# Patient Record
Sex: FEMALE | Race: WHITE
[De-identification: names, ages, dates, MRNs, and addresses within clinical notes are randomized per-mention and may not be internally consistent; named-entity substitution may affect disease eponyms.]

---

## 2019-08-22 ENCOUNTER — HOSPITAL ENCOUNTER (INPATIENT)
Dept: HOSPITAL 84 - D.ER | Age: 84
LOS: 16 days | Discharge: HOME | DRG: 881 | End: 2019-09-07
Attending: PSYCHIATRY & NEUROLOGY | Admitting: PSYCHIATRY & NEUROLOGY
Payer: MEDICARE

## 2019-08-22 VITALS
WEIGHT: 152.51 LBS | HEIGHT: 63 IN | BODY MASS INDEX: 27.02 KG/M2 | HEIGHT: 63 IN | BODY MASS INDEX: 27.02 KG/M2 | BODY MASS INDEX: 27.02 KG/M2 | WEIGHT: 152.51 LBS

## 2019-08-22 VITALS — SYSTOLIC BLOOD PRESSURE: 123 MMHG | DIASTOLIC BLOOD PRESSURE: 54 MMHG

## 2019-08-22 DIAGNOSIS — E78.5: ICD-10-CM

## 2019-08-22 DIAGNOSIS — G25.81: ICD-10-CM

## 2019-08-22 DIAGNOSIS — F32.9: Primary | ICD-10-CM

## 2019-08-22 DIAGNOSIS — G47.00: ICD-10-CM

## 2019-08-22 DIAGNOSIS — E53.8: ICD-10-CM

## 2019-08-22 DIAGNOSIS — K21.9: ICD-10-CM

## 2019-08-22 DIAGNOSIS — I10: ICD-10-CM

## 2019-08-22 DIAGNOSIS — M19.91: ICD-10-CM

## 2019-08-22 DIAGNOSIS — R45.851: ICD-10-CM

## 2019-08-22 LAB
ALBUMIN SERPL-MCNC: 3.7 G/DL (ref 3.4–5)
ALP SERPL-CCNC: 74 U/L (ref 46–116)
ALT SERPL-CCNC: 19 U/L (ref 10–68)
AMPHETAMINES UR QL SCN: NEGATIVE QUAL
ANION GAP SERPL CALC-SCNC: 17 MMOL/L (ref 8–16)
APAP SERPL-MCNC: 0 UG/ML (ref 10–30)
APPEARANCE UR: CLEAR
BACTERIA #/AREA URNS HPF: (no result) /HPF
BARBITURATES UR QL SCN: NEGATIVE QUAL
BASOPHILS NFR BLD AUTO: 0.2 % (ref 0–2)
BENZODIAZ UR QL SCN: POSITIVE QUAL
BILIRUB SERPL-MCNC: 0.36 MG/DL (ref 0.2–1.3)
BILIRUB SERPL-MCNC: NEGATIVE MG/DL
BUN SERPL-MCNC: 19 MG/DL (ref 7–18)
BZE UR QL SCN: NEGATIVE QUAL
CALCIUM SERPL-MCNC: 9.6 MG/DL (ref 8.5–10.1)
CANNABINOIDS UR QL SCN: NEGATIVE QUAL
CHLORIDE SERPL-SCNC: 105 MMOL/L (ref 98–107)
CO2 SERPL-SCNC: 22.8 MMOL/L (ref 21–32)
COLOR UR: YELLOW
CREAT SERPL-MCNC: 1.1 MG/DL (ref 0.6–1.3)
CRYSTALS #/AREA URNS HPF: (no result) /HPF
EOSINOPHIL NFR BLD: 1 % (ref 0–7)
ERYTHROCYTE [DISTWIDTH] IN BLOOD BY AUTOMATED COUNT: 13.4 % (ref 11.5–14.5)
ETHANOL SERPL-MCNC: 0 MG/DL (ref 0–10)
GLOBULIN SER-MCNC: 3.4 G/L
GLUCOSE SERPL-MCNC: 119 MG/DL (ref 74–106)
GLUCOSE SERPL-MCNC: NEGATIVE MG/DL
HCT VFR BLD CALC: 39.5 % (ref 36–48)
HGB BLD-MCNC: 13.9 G/DL (ref 12–16)
IMM GRANULOCYTES NFR BLD: 0.2 % (ref 0–5)
KETONES UR STRIP-MCNC: (no result) MG/DL
LYMPHOCYTES NFR BLD AUTO: 37.9 % (ref 15–50)
MAGNESIUM SERPL-MCNC: 2 MG/DL (ref 1.8–2.4)
MCH RBC QN AUTO: 30.7 PG (ref 26–34)
MCHC RBC AUTO-ENTMCNC: 35.2 G/DL (ref 31–37)
MCV RBC: 87.2 FL (ref 80–100)
MONOCYTES NFR BLD: 9.2 % (ref 2–11)
NEUTROPHILS NFR BLD AUTO: 51.5 % (ref 40–80)
NITRITE UR-MCNC: NEGATIVE MG/ML
OPIATES UR QL SCN: NEGATIVE QUAL
OSMOLALITY SERPL CALC.SUM OF ELEC: 283 MOSM/KG (ref 275–300)
PCP UR QL SCN: NEGATIVE QUAL
PH UR STRIP: 5 [PH] (ref 5–6)
PLATELET # BLD: 195 10X3/UL (ref 130–400)
PMV BLD AUTO: 10.4 FL (ref 7.4–10.4)
POTASSIUM SERPL-SCNC: 3.8 MMOL/L (ref 3.5–5.1)
PROT SERPL-MCNC: 7.1 G/DL (ref 6.4–8.2)
PROT UR-MCNC: (no result) MG/DL
RBC # BLD AUTO: 4.53 10X6/UL (ref 4–5.4)
RBC #/AREA URNS HPF: (no result) /HPF (ref 0–5)
SODIUM SERPL-SCNC: 141 MMOL/L (ref 136–145)
SP GR UR STRIP: 1.02 (ref 1–1.02)
SQUAMOUS #/AREA URNS HPF: (no result) /HPF (ref 0–5)
UROBILINOGEN UR-MCNC: NORMAL MG/DL
WBC # BLD AUTO: 10.1 10X3/UL (ref 4.8–10.8)
WBC #/AREA URNS HPF: (no result) /HPF (ref 0–5)

## 2019-08-22 NOTE — NUR
DR KNIGHT NOTIFIED AND REVIEWED PT'S BEHAVIOR AND ASSESSMENT RESULTS.  PT IS
A LOW RISK PER DR KNIGHT.  DR KNIGHT STATED TO GIVE RESOURCES TO PT AT TIME
OF DISCHARGE.  NO FURTHER ORDERS AT THIS TIME.  RESOURCES REVIEWED WITH PT AND
SHE VERBALIZED UNDERSTANDING.  PT STATES THAT SHE HAS HAD
FLEETING THOUGHTS OF
ENDING HER LIFE.  HOWEVER, PT STATES THAT SHE CAN EASILY PUT SUCH THOUGHTS OUT
OF HER MIND.  STATES, " I AM A Latter-day AND I WOULD NEVER FOLLOW THROUGH WITH
HARMING MYSELF.  I AM JUST UNDER A TREMENDOUS AMOUNT OF STRESS AND I NEED
HELP."  PT EXPRESSES A DESIRE TO ADMIT INTO SENIOR CARE TO MANAGE MEDICATIONS
IN HOPES HER IMPROVING HER DEPRESSIVE STATE.  PT STATES THAT SHE IS DEPRESSED
AND FEELS THAT HER CURRENT MEDICATION IS NOT EFFECTIVE.

## 2019-08-22 NOTE — NUR
NEW ADMIT TO DR KNIGHT ON SENIOR CARE FROM [facility] ED FOR DEPRESSION. PATIENT
TRANSPORTED FROM ED TO [facility] VIA WHEELCHAIR. ACCOMPANIED BY STAFF AND
DAUGHTER, JAJA. UPON ARRIVAL TO [facility], PATIENT WAS CALM AND COOPERATIVE
WITH ADMISSION ASSESSMENTS, EKG, AND VITALS. CONSENTS TO TREAT SIGNED BY
PATIENT. CODE WORD IS JAJA. CODE STATUS DISCUSSED WITH PATIENT AND FAMILY.
PATIENT IS TO BE A FULL CODE. PATIENT ORIENTED TO ROOM, UNIT, AND CALL BELL
SYSTEM. PATIENT RESTING IN BED QUIETLY WITH EYES OPEN. BED IN LOWEST POSITION.
2 SIDE RAILS UP. CALL BELL IN REACH. NO FURTHER NEEDS AT THIS TIME.

## 2019-08-23 VITALS — DIASTOLIC BLOOD PRESSURE: 63 MMHG | SYSTOLIC BLOOD PRESSURE: 126 MMHG

## 2019-08-23 LAB
ALBUMIN SERPL-MCNC: 3.7 G/DL (ref 3.4–5)
ALP SERPL-CCNC: 74 U/L (ref 46–116)
ALT SERPL-CCNC: 15 U/L (ref 10–68)
ANION GAP SERPL CALC-SCNC: 15.1 MMOL/L (ref 8–16)
BASOPHILS NFR BLD AUTO: 0.1 % (ref 0–2)
BILIRUB SERPL-MCNC: 0.52 MG/DL (ref 0.2–1.3)
BUN SERPL-MCNC: 18 MG/DL (ref 7–18)
CALCIUM SERPL-MCNC: 10 MG/DL (ref 8.5–10.1)
CHLORIDE SERPL-SCNC: 105 MMOL/L (ref 98–107)
CHOLEST/HDLC SERPL: 3.5 RATIO (ref 2.3–4.1)
CO2 SERPL-SCNC: 26.2 MMOL/L (ref 21–32)
CREAT SERPL-MCNC: 0.9 MG/DL (ref 0.6–1.3)
EOSINOPHIL NFR BLD: 1 % (ref 0–7)
ERYTHROCYTE [DISTWIDTH] IN BLOOD BY AUTOMATED COUNT: 13.5 % (ref 11.5–14.5)
EST. AVERAGE GLUCOSE BLD GHB EST-MCNC: 126 MG/DL (ref 74–154)
GLOBULIN SER-MCNC: 2.8 G/L
GLUCOSE SERPL-MCNC: 133 MG/DL (ref 74–106)
HCT VFR BLD CALC: 39 % (ref 36–48)
HDLC SERPL-MCNC: 54 MG/DL (ref 32–96)
HGB BLD-MCNC: 13.4 G/DL (ref 12–16)
IMM GRANULOCYTES NFR BLD: 0.1 % (ref 0–5)
LDL-HDL RATIO: 2.2 RATIO (ref 1.5–3.5)
LDLC SERPL-MCNC: 120 MG/DL (ref 0–100)
LYMPHOCYTES NFR BLD AUTO: 26.8 % (ref 15–50)
MCH RBC QN AUTO: 30.1 PG (ref 26–34)
MCHC RBC AUTO-ENTMCNC: 34.4 G/DL (ref 31–37)
MCV RBC: 87.6 FL (ref 80–100)
MONOCYTES NFR BLD: 6.6 % (ref 2–11)
NEUTROPHILS NFR BLD AUTO: 65.4 % (ref 40–80)
OSMOLALITY SERPL CALC.SUM OF ELEC: 286 MOSM/KG (ref 275–300)
PLATELET # BLD: 190 10X3/UL (ref 130–400)
PMV BLD AUTO: 10.2 FL (ref 7.4–10.4)
POTASSIUM SERPL-SCNC: 4.3 MMOL/L (ref 3.5–5.1)
PROT SERPL-MCNC: 6.5 G/DL (ref 6.4–8.2)
RBC # BLD AUTO: 4.45 10X6/UL (ref 4–5.4)
SODIUM SERPL-SCNC: 142 MMOL/L (ref 136–145)
TRIGL SERPL-MCNC: 83 MG/DL (ref 30–200)
TSH SERPL-ACNC: 1.24 UIU/ML (ref 0.36–3.74)
WBC # BLD AUTO: 6.7 10X3/UL (ref 4.8–10.8)

## 2019-08-23 NOTE — NUR
PATIENT DAUGHTER CALLED THIS AM AND INQURIED ABOUT HOW HER MOTHER NIGHT WAS
AND HOW HER MORNING IS GOING. PASSCODE GIVEN AND CORRECT. NURSE GAVE
INFORMATION ABOUT SLEEP TIMES AND MEDICATION REQUEST. DAUGHTER ASKED WHAT THE
MEDICATION WAS AND NURSE EXPLAINED.

## 2019-08-23 NOTE — NUR
B) The patient is awake and alert, she admits she is depressed and she says
she did not sleep well last night. She also admits that she fell at home
before she got here on her right buttocks, did assess the area and there is no
bruising noted. I) Provide prescribed meds. R) The patient says she has been
on prozac for close to six weeks she says she feels like it has helped a
little bit but then she says it does not feel better other times. P) Continue
POC.

## 2019-08-23 NOTE — NUR
REC'D PT SITTING IN CHAIR. ORIENTED X4. RELATES REASON FOR HOSPITALIZATION IS
"TO GET SOME HELP TO MAKE ME FEEL BETTER. HELP WITH MY DEPRESSION. TRY TO GET
MY MEDICINE RIGHT." CALM AND COOPERATIVE. ADMINISTER MEDS PER ORDERS Q SHIFT
AND MONITOR COMPLIANCE. ENCOURAGE PATIENT TO VOICE FEELINGS AND CONCERNS TO
STAFF Q SHIFT AND AS NEEDED. MED COMPLIANT. GOAL ORIENTED AS IS AWARE OF THE
DEPRESSION AND WANTING TO GET MEDICATION RIGHT TO MAKE HER FEEL BEETER.
CONTINUE POC AND PROVIDE SAFE ENVIRONMENT.

## 2019-08-23 NOTE — NUR
The patient states "The medicine is not working yet." Explained to her that
sometimes it takes awhile.

## 2019-08-23 NOTE — NUR
The patient is awake and alert, she says she has not slept and she feels
anxious. Did offer her ativan 0.5 mg po now.

## 2019-08-23 NOTE — NUR
LAYING IN BED. AWAKE. RELATED "I'M NOT FEELING SLEEPY YET. I DIDN'T DRINK MUCH
WATER SO I WOULDN'T HAVE TO GET UP AND GO TO THE BATHROOM."

## 2019-08-24 VITALS — DIASTOLIC BLOOD PRESSURE: 52 MMHG | SYSTOLIC BLOOD PRESSURE: 102 MMHG

## 2019-08-24 VITALS — DIASTOLIC BLOOD PRESSURE: 43 MMHG | SYSTOLIC BLOOD PRESSURE: 92 MMHG

## 2019-08-24 VITALS — SYSTOLIC BLOOD PRESSURE: 102 MMHG | DIASTOLIC BLOOD PRESSURE: 52 MMHG

## 2019-08-24 NOTE — PSY
PATIENT NAME:KELLY BAEZA                                    MEDICAL RECORD: [id number]
: 37                                              LOCATION:[facility]
ADMISSION DATE: 19    ACCOUNT: [id number]
                                                           
PSYCHIATRIC EVALUATION
 
 
DATE OF EVALUATION: 19
 
 
IDENTIFYING DATA:  The patient is 84 years old and she is admitted to the
hospital on a voluntary basis.
 
CHIEF COMPLAINT:  Depression.
 
HISTORY OF PRESENT ILLNESS:  The patient presented to the Emergency Room
reporting depression, anxiety, and ongoing thoughts of self-harm, which she is
reluctant to act on because of her Latter day mychal.  She tells me that if it
were not for her Latter day mychal that she would kill herself that she feels
helpless, hopeless, isolated and withdrawn.  She relates this to having to care
for her ill  and apparently he has had medical problems since an accident
30 years ago.  The problems have changed and gotten worse, but she has cared for
him all of this time.  She has been tried on various antidepressants with little
or no effect.  She denies psychotic symptoms.  She is also most concerned about
being unable to sleep adequately.
 
PAST MEDICAL HISTORY:  Significant for hypertension, osteoarthritis, and a left
mastectomy.
 
PAST PSYCHIATRIC HISTORY:  None by her account, although over the past 3 months,
she has had serious depressive issues.
 
FAMILY HISTORY:  Noncontributory.
 
ALLERGIES:  SULFA.
 
CURRENT MEDICATIONS:  Includes Xanax, Prozac, Reglan, Pravachol, Cozaar, Ambien,
and Tylenol.
 
SOCIAL HISTORY:  The patient is .  She has adult children.  She has no
history of drug or alcohol abuse and functioned well socially and
occupationally.
 
MENTAL STATUS EXAMINATION:  The patient is awake, alert, and oriented fully to
person, place, time and situation.  Her mood is depressed.  Her affect is
constricted.  Thought processes are circumstantial.  Memory, concentration, and
abstraction abilities are moderately impaired and she denies any active intent
to harm herself or others as well as overt psychotic symptoms.
 
ASSETS:  Supportive family members.
 
LIABILITIES:  Limited insight.
 
DIAGNOSTIC IMPRESSION:
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
features.
AXIS II:  None.
AXIS III:  Hypertension, hyperlipidemia, and gastroesophageal reflux disease.
 
AXIS IV:  Moderate stressors.
AXIS V:  Global assessment of functioning is 30.
 
PLAN:  At this time, the patient is admitted to the hospital for a comprehensive
medical, psychological, and social evaluation.  She will be treated with both
mood stabilizing and memory enhancing medications.  Her long-term prognosis is
guarded.
 
TRANSINT:[id number] Voice Confirmation ID: [id number] DOCUMENT ID: [id number]
                                           
                                           MERY KNIGHT MD             
 
 
 
Electronically Signed by MERY KNIGHT on 19 at 1239
 
 
 
 
 
 
 
 
 
 
 
 
 
 
 
 
 
 
 
 
 
 
 
 
 
 
 
 
 
 
 
 
 
 
 
 
 
CC:                                                             [id number]
DICTATION DATE: 19     :     19 1830      ADM IN  
                                                                              
[id number] [place] [id number]

## 2019-08-24 NOTE — NUR
REC'D PATIENT SITTING IN CHAIR AWAITING BREAKFAST.
RESP EVEN AND NONLABORED. NO ACUTE
DISTRESS NOTED. PT SLEPT 8 HOURS. STAFF ASSISTED TO BATHROOM AS NEEDED. BED
ALARM IN PLACE AND ACTIVE. PT ORIENTED X4. AMBULATES. NO BEHAVIOR NOTED FROM
PREVIOUS SHIFT. WILL CONT PLAN OF CARE.

## 2019-08-24 NOTE — NUR
PATIENT IS QUIET, STAYS TO HERSELF, CAN MAKE NEEDS KNOWN, COMPLIANT WITH
MEDS, HAS INSIGHT TO HER SITUATION. WILL FOLLOW POC

## 2019-08-25 VITALS — SYSTOLIC BLOOD PRESSURE: 99 MMHG | DIASTOLIC BLOOD PRESSURE: 62 MMHG

## 2019-08-25 VITALS — DIASTOLIC BLOOD PRESSURE: 46 MMHG | SYSTOLIC BLOOD PRESSURE: 99 MMHG

## 2019-08-25 NOTE — PN
PATIENT:KELLY BAEZA                                MEDICAL RECORD: [id number]
                                                         LOCATION:[facility]
                                                         ADMISSION DATE: 08/22/19
 
PROGRESS NOTE
 
 
DATE OF SERVICE:  08/24/2019
 
SUBJECTIVE:  The patient's case was discussed with staff.  She has no new
complaint.
 
OBJECTIVE:  The patient denies intent to harm herself or others.  She is
tolerating her medicines well.
 
ASSESSMENT:  No change in diagnoses.
 
PLAN:  Current medicines have been reviewed and will be maintained.  I am going
to start her on Effexor for its antidepressant effect.
 
TRANSINT:[id number] Voice Confirmation ID: [id number] DOCUMENT ID: [id number]
 
 
 
 
                                           
                                           MERY KNIGHT MD             
 
 
 
Electronically Signed by MERY KNIGHT on 08/25/19 at 1114
 
 
 
 
 
 
 
 
 
 
 
 
 
 
 
 
 
 
 
 
CC:                                                             [id number]
DICTATION DATE: 08/24/19 1253     :     08/24/19 1258      ADM IN  
                                                                              
[address] [place] [address]

## 2019-08-25 NOTE — NUR
PATIENT SITTING AND EATING BREAKFAST AT THIS TIME. PT ABLE TO MAKE NEEDS
KNOWN. PT IS ORIENTED X4 AND ALERT. PT C/O OF BEING DIZZY. PT IN ASSISSTIVE
DEVICE TO PREVENT A FALL. VITALS SIGNS: 99/62, 91, 98.1, 18, 96%. PT SLPET
4.25 HOURS. NIGHT SHIFT DID PROVIDE A PO ATIVAN 0.5 MG. WILL CONT PLAN OF
CARE.

## 2019-08-25 NOTE — NUR
PATIENT HAS NOT BEEN SLEEPING THIS SHIFT THIS FAR, HER LIGHTS HAVE BEEN OFF
PER HER REQUEST. ATIVAN GIVEN PO FOR FEELINGS OF ANXIOUSNESS.

## 2019-08-25 NOTE — NUR
RECEIVED IN BEDROOM. RESTING IN BED WITH EYES CLOSED. RESPONDS TO VOCIE. CALM
AND COOPERATIVE WITH CARE AND ASSESSMENT. ENCOURAGE TO EXPRESS NEEDS. RESTING
IN BED WITH EYES CLOSED AT THIS TIME. CONTINUE PLAN OF CARE

## 2019-08-26 VITALS — DIASTOLIC BLOOD PRESSURE: 45 MMHG | SYSTOLIC BLOOD PRESSURE: 93 MMHG

## 2019-08-26 VITALS — SYSTOLIC BLOOD PRESSURE: 105 MMHG | DIASTOLIC BLOOD PRESSURE: 60 MMHG

## 2019-08-26 NOTE — NUR
RECEIVED PT. IN DINING ROOM FOR B'FAST, ALERT, CALM, QUIET, DEPRESSED MOOD,
COOPERATIVE.  MEDS ADMIN PER ORDERS WITH COMPLETE MED COMPLIANCE NOTED.  CONT
POC AS DIRECTED.

## 2019-08-26 NOTE — PN
PATIENT:KELLY BAEZA                                MEDICAL RECORD: [id number]
                                                         LOCATION:[facility]
                                                         ADMISSION DATE: 08/22/19
 
PROGRESS NOTE
 
 
DATE OF SERVICE:  08/25/2019
 
SUBJECTIVE:  The patient's case was discussed with staff.  She has no new
complaint.
 
OBJECTIVE:  The patient is in good behavioral control.  She has poor insight
about her condition.  She is tolerating her medicines well.  Unfortunately, she
did not sleep well last night.
 
ASSESSMENT:  No change in diagnoses.
 
PLAN:  I am going to give the patient a higher dose of Xanax at bedtime
secondary to the poor response to the lower dose.  She is obsessively focused on
sleep.  I am sure the sleep disruption is related to her depression.  She will
be monitored for clinical changes associated with its use.
 
TRANSINT:[id number] Voice Confirmation ID: [id number] DOCUMENT ID: [id number]
 
 
 
 
                                           
                                           MERY KNIGHT MD             
 
 
 
Electronically Signed by MERY KNIGHT on 08/26/19 at 1342
 
 
 
 
 
 
 
 
 
 
 
 
 
 
 
 
 
CC:                                                             [id number]
DICTATION DATE: 08/25/19 1121     :     08/25/19 1202      ADM IN  
                                                                              
[id number] [place] [address]

## 2019-08-26 NOTE — NUR
RECEIVED IN HALLWAY OUTSIDE OF NURSES STATION IN A WHELLCHAIR. CALM AND
COOPERATIVE WITH CARE AND ASSESSMENT. ENCOURAGE TO EXPRESS NEEDS AND FEELINGS.
STATES THAT SHE HAS DEPRESSION RELATED TO GOING HOME AND TAKING CARE OF HER
SICK . SOCIALIZED WITH PATIENT FOR A WHILE. CONTINUES TO SIT IN
WHEELCHAIR AT NURSES STATION. CONTINUE PLAN OF CARE

## 2019-08-27 VITALS — DIASTOLIC BLOOD PRESSURE: 53 MMHG | SYSTOLIC BLOOD PRESSURE: 108 MMHG

## 2019-08-27 VITALS — SYSTOLIC BLOOD PRESSURE: 92 MMHG | DIASTOLIC BLOOD PRESSURE: 45 MMHG

## 2019-08-27 NOTE — PN
PATIENT:KELLY BAEZA                                MEDICAL RECORD: [id number]
                                                         LOCATION:[facility]
                                                         ADMISSION DATE: 08/22/19
 
PROGRESS NOTE
 
 
DATE OF SERVICE:  
 
SUBJECTIVE:  The patient's case was discussed with staff.  She has no new
complaint.
 
OBJECTIVE:  The patient is in good behavioral control with limited insight about
her condition.  She tolerates her medicines well.
 
ASSESSMENT:  No change in diagnoses.
 
PLAN:  Supportive and educational interventions were made.  Long-term prognosis
is guarded.
 
TRANSINT:[id number] Voice Confirmation ID: [id number] DOCUMENT ID: [id number]
 
 
 
 
                                           
                                           MERY KNIGHT MD             
 
 
 
Electronically Signed by MERY KNIGHT on 08/27/19 at 1220
 
 
 
 
 
 
 
 
 
 
 
 
 
 
 
 
 
 
 
 
CC:                                                             [id number]
DICTATION DATE: 08/26/19 1520     :     08/26/19 1822      ADM IN  
                                                                              
[address] [place] [address]

## 2019-08-27 NOTE — NUR
RESTING QUIETLY IN RECLINER, WATCHING TV AND VISITING WITH PEERS, CALM, QUIET,
PLEASANT, COOPERATIVE.  MOOD SEEMS SOMEWHAT IMPROVED.  COMPLIANT WITH PLAN OF
CARE, CONT POC AS DIRECTED.

## 2019-08-27 NOTE — NUR
RECEIVED IN HALLWAY SITTING IN A RECLINING CHAIR OUTSIDE OF NURSES STATION.
SOCIALIZING AT TIMES WITH PEERS. ENCOURAGE TO EXPRESS NEEDS. RESTING IN BED
WITH EYES CLOSED. CONTINUE PLAN OF CARE

## 2019-08-28 VITALS — SYSTOLIC BLOOD PRESSURE: 125 MMHG | DIASTOLIC BLOOD PRESSURE: 60 MMHG

## 2019-08-28 VITALS — DIASTOLIC BLOOD PRESSURE: 60 MMHG | SYSTOLIC BLOOD PRESSURE: 82 MMHG

## 2019-08-28 NOTE — NUR
ALERT AND ORIENTED X2.FLAT AFFECT.DENIES SUICIDAL THOUGHTS,STATES I'M A
Voodoo ,I WOULDN'T DO THAT.IS COMPLIANT WITH STAFF AND MEDS.WILL CONTINUE
WITH  PLAN OF CARE,MONITOR FOR SAFETY AND CHANGES.

## 2019-08-28 NOTE — PN
PATIENT:KELLY BAEZA                                MEDICAL RECORD: [id number]
                                                         LOCATION:[facility]
                                                         ADMISSION DATE: 08/22/19
 
PROGRESS NOTE
 
 
DATE OF SERVICE:  08/27/2019
 
SUBJECTIVE:  The patient's case was discussed with staff.  She has no new
complaint.
 
OBJECTIVE:  The patient is significantly and unacceptably sleepy.  I am going to
stop both her trazodone and Xanax and will allow her to wash these out.
 
TRANSINT:[id number] Voice Confirmation ID: [id number] DOCUMENT ID: [id number]
 
 
 
 
                                           
                                           MERY KNIGHT MD             
 
 
 
Electronically Signed by MERY KNIGHT on 08/28/19 at 0956
 
 
 
 
 
 
 
 
 
 
 
 
 
 
 
 
 
 
 
 
 
 
 
 
 
CC:                                                             [id number]
DICTATION DATE: 08/27/19 1228     :     08/27/19 1254      ADM IN  
                                                                              
[id number] [place] [address]

## 2019-08-29 VITALS — DIASTOLIC BLOOD PRESSURE: 59 MMHG | SYSTOLIC BLOOD PRESSURE: 124 MMHG

## 2019-08-29 VITALS — SYSTOLIC BLOOD PRESSURE: 110 MMHG | DIASTOLIC BLOOD PRESSURE: 57 MMHG

## 2019-08-29 NOTE — NUR
B) Patient is alert and oriented to person, place and time,
I) Administered scheduled medications as ordered, monitored for needs,
R) mediation compliant, pleasant and friendly toward staff,
P) Continue plan of care.

## 2019-08-29 NOTE — NUR
B) The patient says she did not sleep well, but night shift says she slept 7.5
hours. Explained to her again that she needs to hold her hand up to let staff
know she is awake. She says "Well, how do they know I am sleeping?" Explained
to her that "Staff go in your room." Her mood is flat and she is nonreactive
in affect. Asked her if she is depressed and she said "Yes" She is not
interacting as much as she had the last time I spoke to her. She is saying she
is too weak to walk. Explained to her that she needs to walk, but staff did
assist her to ambulate. I) Provide prescribed meds. R) The patient is
compliant with meds. P) Continue POC.

## 2019-08-29 NOTE — PN
PATIENT:KELLY BAEZA                                MEDICAL RECORD: [id number]
                                                         LOCATION:[facility]
                                                         ADMISSION DATE: 08/22/19
 
PROGRESS NOTE
 
 
DATE OF SERVICE:  08/28/2019
 
SUBJECTIVE:  The patient's case was discussed with staff.  She has no new
complaint.
 
OBJECTIVE:  The patient is in good behavioral control.  She has limited insight
about her situation.  She is tolerating her medicines well.  She has been
hypotensive for unknown reasons.
 
ASSESSMENT:  No change in diagnoses.
 
PLAN:  I am not sure why the patient is hypotensive.  I think this is what is
making do not feel well.  I am going to discontinue her Effexor and will monitor
her blood pressure.
 
TRANSINT:[id number] Voice Confirmation ID: [id number] DOCUMENT ID: [id number]
 
 
 
 
                                           
                                           MERY KNIGHT MD             
 
 
 
Electronically Signed by MERY KNIGHT on 08/29/19 at 1502
 
 
 
 
 
 
 
 
 
 
 
 
 
 
 
 
 
 
CC:                                                             [id number]
DICTATION DATE: 08/28/19 1554     :     08/28/19 2323      ADM IN  
                                                                              
[id number] [place] [address]

## 2019-08-29 NOTE — NUR
TEAM TREATMENT REVIEW
DIET: Regular Diet
PO INTAKE: 89% x 9 meals
WT: 8/22- 140 lbs, 8/25- 144.8 lbs
BM: x 1 on 8/28
MEDS: Miralax, Vit 12, Pravastatin
LABS: LDL- 120(H), Vit B12- 1985(H)
GOALS: PO intake > 75%, stable wt, BM q 3 days, maintain skin integrity,
Vit B12 WNL
 
Will continue to monitor closely
Clinical Dietitian Following

## 2019-08-30 VITALS — DIASTOLIC BLOOD PRESSURE: 54 MMHG | SYSTOLIC BLOOD PRESSURE: 110 MMHG

## 2019-08-30 NOTE — NUR
SW SPOKE WITH PT AND ENCOURAGED HER TO EAT A SNACK. PT REFUSED. SW DID GET PT
TO DRINK SOME WATER WHILE DISCUSSING SYMPTOMS AND POSSIBLE COPING SKILLS SHE
CAN USE. SW GAVE PT A BIBLE AND ASKED HER TO READ IT FOR FIVE MINUTES. PT
COMPLIED WITH ACTIVITY. JAKE ASKED PT ABOUT HER GRATITIUDE LIST FOR TODAY AND
SHE STATED I AM NOT VERY GRATEFUL FOR ANYTHING. PT HAS A FLAT AFFECT AND
CONTINUES TO HAVE SOMATIC COMPLAINTS.

## 2019-08-30 NOTE — NUR
The patient's daughter Cathleen Quigley expresses her wishes to speak to Dr. Chen because she explained to Nayely Olvera RN that her mother is not
able to go home. Nayely explained that [facility] is our  and
she is the one that assists families with placement. Ms. Quigley says she would
like to speak to Dr. Chen. Ms. Quigley phone number is [phone].

## 2019-08-30 NOTE — PN
PATIENT:KELLY BAEZA                                MEDICAL RECORD: [id number]
                                                         [facility]ATION:[facility]
                                                         ADMISSION DATE: 08/22/19
 
PROGRESS NOTE
 
 
DATE OF SERVICE:  08/29/2019
 
SUBJECTIVE:  The patient's case was discussed with staff.  She has no new
complaint.
 
OBJECTIVE:  The patient tolerates her medicines well.  She has pretty limited
insight about her situation.  She says she is not suicidal.  She seems awfully
withdrawn and blunted.  She complains that she did not sleep last night,
although the nursing staff put down 8 hours of sleep.
 
ASSESSMENT:  No change in diagnoses.
 
PLAN:  The patient will be given a low dose of Celexa to assist with her mood
disorder.  Her blood pressure has improved.  I will give her a low dose of
trazodone to assist with sleep consolidation.
 
TRANSINT:[id number] Voice Confirmation ID: [id number] DOCUMENT ID: [id number]
 
 
 
 
                                           
                                           MERY KNIGHT MD             
 
 
 
Electronically Signed by MERY KNIGHT on 08/30/19 at 1606
 
 
 
 
 
 
 
 
 
 
 
 
 
 
 
 
 
CC:                                                             [id number]
DICTATION DATE: 08/29/19 1603     :     08/29/19 1709      ADM IN  
                                                                              
[facility]                                          
[phone]

## 2019-08-30 NOTE — NUR
B) The patient is awake and alert, she is depressed, but she did smile
alittle. She is ambulating. I) Provide prescribed meds. R) The patient is
compliant with meds. P) Continue POC.

## 2019-08-30 NOTE — NUR
SPOKE WITH PT'S DAUGHTER AT LENGTH IN REGARDS TO PT CONDITION, BEHAVIOR,
MEDICTIONS, TREATMENT PLAN AND DISCHARGE PLANNING AND RESOURCES. REVIEWED PT'S
SLEEPING AMOUNT AND INTAKE FOOD PERCENTAGE FOR PREVIOUS DAY. DAUGHTER
VERBALIZED UNDERSTANDING. DR. KNIGHT TO CALL JAJA ONCE HE GETS TO HOSPITAL.
PT IS MORE AWAKE TODAY BUT CONTINUES TO BE DEPRESSED AND REFUSING TO DO GROUP
ACTIVITIES. ONE ON ONE GROUP DONE WITH PT AND  AND DIRECTOR.
COPING SKILLS EDUCATED ATTEMPTED TO GET PT TO READ HER BIBLE DUE TO HER
DAUGHTER STATING SHE USED TO LOVE "BIBLE STUDY". PT ONLY READ 4 PAGES.
ENCOURAGED PT TO CONTINUE WITHOUT SUCCESS. DAUGHTER THANKED ME FOR MY TIME AND
FOR HELPING HER.

## 2019-08-31 VITALS — DIASTOLIC BLOOD PRESSURE: 58 MMHG | SYSTOLIC BLOOD PRESSURE: 109 MMHG

## 2019-08-31 VITALS — SYSTOLIC BLOOD PRESSURE: 121 MMHG | DIASTOLIC BLOOD PRESSURE: 41 MMHG

## 2019-08-31 NOTE — PN
PATIENT:KELLY BAEZA                                MEDICAL RECORD: [id number]
                                                         [facility]ATION:[facility]
                                                         ADMISSION DATE: 08/22/19
 
PROGRESS NOTE
 
 
DATE OF SERVICE:  08/30/2019
 
SUBJECTIVE:  The patient's case was discussed with staff.  She has no new
complaint.
 
OBJECTIVE:  The patient slept 8-1/2 hours last night.  She still has a depressed
mood.
 
ASSESSMENT:  No change in diagnoses.
 
PLAN:  Current medicines have been reviewed and will be maintained.  Long-term
prognosis is guarded.
 
TRANSINT:[id number] Voice Confirmation ID: [id number] DOCUMENT ID: [id number]
 
 
 
 
                                           
                                           MERY KNIGHT MD             
 
 
 
Electronically Signed by MERY KNIGHT on 08/31/19 at 1225
 
 
 
 
 
 
 
 
 
 
 
 
 
 
 
 
 
 
 
 
CC:                                                             [id number]
DICTATION DATE: 08/30/19 1711     :     08/30/19 2212      ADM IN  
                                                                              
[address] [address]

## 2019-08-31 NOTE — NUR
B) The patient is awake and alert, her mood is blunted, but her affect is
reactive. She did smile this am. She interacts with staff appropriately. She
denies S.I., but remains depressed, but did say she felt better this am. I)
Provide prescribed meds. R) The patient is compliant with meds and unit
milieu. P) Continue POC.

## 2019-08-31 NOTE — NUR
B) Patient is alert and oriented to person, place and time, calm and
cooperative with staff
I) Administered scheduled medications as ordered, monitored for needs,
R) Medication compliant, pleasant and friendly
P) Continue plan of care.

## 2019-09-01 VITALS — DIASTOLIC BLOOD PRESSURE: 53 MMHG | SYSTOLIC BLOOD PRESSURE: 113 MMHG

## 2019-09-01 VITALS — DIASTOLIC BLOOD PRESSURE: 51 MMHG | SYSTOLIC BLOOD PRESSURE: 124 MMHG

## 2019-09-01 NOTE — NUR
RECEIVED IN DAYROOM. SITTING IN A CHAIR WITH PEERS AT HER SIDE. CALM AND
COOPERATIVE WITH CARE AND ASSESSMENT. ENCOURAGE TO EXPRESS NEEDS. RESTING IN
BED WITH EYES CLOSED AT THIS TIME. CONTINUE PLAN OF CARE

## 2019-09-01 NOTE — NUR
ORIENTED X 4.COMPLIANT WITH STAFF AND MEDS.WILL CONTINUE WITH CURRENT PLAN OF
CARE,MONITOR FOR CHANGES AND SAFETY.NO AGGRESSION OBSERVED.

## 2019-09-01 NOTE — NUR
B.) PT IS ALERT AND ORIENTED X4. SHE IS PLEASANT WITH STAFF AND PEERS. SHE IS
ABLE TO MAKE NEEDS KNOWN. SHE IS ADL INDEPENDENT.
I.) PROVIDED PM MEDICATIONS.
R.) COMPLIANT WITH ALL MEDICATIONS.
P.) CONTINUE PLAN OF CARE

## 2019-09-02 VITALS — DIASTOLIC BLOOD PRESSURE: 62 MMHG | SYSTOLIC BLOOD PRESSURE: 142 MMHG

## 2019-09-02 NOTE — PN
PATIENT:KELLY BAEZA                                MEDICAL RECORD: [id number]
                                                         [facility]ATION:[facility]
                                                         ADMISSION DATE: 08/22/19
 
PROGRESS NOTE
 
 
DATE OF SERVICE:  08/31/2019
 
SUBJECTIVE:  The patient's case was discussed with staff.  She has no new
complaint.
 
OBJECTIVE:  The patient is running a little higher blood pressure than she
previously was.  She is still feeling fatigued.  She denies that she would harm
herself or others.  She is oriented fully.  She did not eat breakfast.  She did
not eat lunch or dinner yesterday for reasons that are unclear, but probably
just related to her severe depression.
 
TRANSINT:[id number] Voice Confirmation ID: [id number] DOCUMENT ID: [id number]
 
 
 
 
                                           
                                           MERY KNIGHT MD             
 
 
 
Electronically Signed by MERY KNIGHT on 09/02/19 at 1016
 
 
 
 
 
 
 
 
 
 
 
 
 
 
 
 
 
 
 
 
 
 
CC:                                                             [id number]
DICTATION DATE: 08/31/19 1237     :     08/31/19 1312      ADM IN  
                                                                              
[address] [place] [address]

## 2019-09-02 NOTE — NUR
PT IS ALERT AND ORIENTED X4. CALM AND COOPERATIVE WITH ASSESSMENT. MED
COMPLIANT. DENIES SI AT THIS TIME, PT STILL APPEARS DEPRESSED. NO BEHAVIORS
NOTED AT THIS TIME. FALL PRECAUTIONS IN PLACE FOR SAFETY. WILL CPOC.

## 2019-09-02 NOTE — PN
PATIENT:KELLY BAEZA                                MEDICAL RECORD: [id number]
                                                         LOCATION:[facility]
                                                         ADMISSION DATE: 08/22/19
 
PROGRESS NOTE
 
 
DATE OF SERVICE:  09/01/2019
 
SUBJECTIVE:  The patient's case was discussed with staff.  She has no new
complaint.
 
OBJECTIVE:  The patient was anxious last night and received p.r.n. Haldol and
Ativan.  She complains bitterly about not sleeping adequately.
 
ASSESSMENT:  No change in diagnoses.
 
PLAN:  I am going to treat the patient with a higher dose of trazodone to assist
with sleep consolidation.  Her long-term prognosis is guarded.
 
TRANSINT:[id number] Voice Confirmation ID: [id number] DOCUMENT ID: [id number]
 
 
 
 
                                           
                                           MERY KNIGHT MD             
 
 
 
Electronically Signed by MERY KNIGHT on 09/02/19 at 1016
 
 
 
 
 
 
 
 
 
 
 
 
 
 
 
 
 
 
 
 
CC:                                                             [id number]
DICTATION DATE: 09/01/19 0919     :     09/01/19 1010      ADM IN  
                                                                              
[id number] [place] [id number]

## 2019-09-03 VITALS — SYSTOLIC BLOOD PRESSURE: 102 MMHG | DIASTOLIC BLOOD PRESSURE: 47 MMHG

## 2019-09-03 VITALS — SYSTOLIC BLOOD PRESSURE: 114 MMHG | DIASTOLIC BLOOD PRESSURE: 55 MMHG

## 2019-09-03 VITALS — DIASTOLIC BLOOD PRESSURE: 60 MMHG | SYSTOLIC BLOOD PRESSURE: 120 MMHG

## 2019-09-03 NOTE — PN
PATIENT:KELLY BAEZA                                MEDICAL RECORD: [id number]
                                                         LOCATION:[facility]
                                                         ADMISSION DATE: 08/22/19
 
PROGRESS NOTE
 
 
DATE OF SERVICE:  09/02/2019
 
SUBJECTIVE:  The patient's case was discussed with staff.  She has no new
complaint.
 
OBJECTIVE:  The patient did not sleep very well last night, but staff tells me
that she took several naps through the day.  She did eat well.  She is anxious
to leave here and wants to go to rehabilitation.  Her mood seems a little
better.  She is smiling and animated a little and she again is still denying
that she would seek to harm herself.
 
ASSESSMENT:  Major depression.
 
PLAN:  The patient tells me today that her father had severe depression and
underwent ECT.  I am certain she did not tell me this before.  I routinely ask
patients about any history of depression and mental health treatment and
although I cannot specifically say I definitely remember asking her about this,
I am pretty sure I did and she just did not bring it up before.  This does not
change what I am going to do to try to help her, but it is good information to
have that her father late in life developed a severe depression.  He was not
adequately treated with medications and he underwent ECT treatment.  I asked her
if she would be interested in this as a possibility and she says no because the
treatments made him very confused.  At this point, I think it is reasonable to
continue her on her current antidepressant and seek rehabilitative services if
she will qualify.
 
TRANSINT:[id number] Voice Confirmation ID: [id number] DOCUMENT ID: [id number]
 
 
 
 
                                           
                                           MERY KNIGHT MD             
 
 
 
Electronically Signed by MERY KNIGHT on 09/03/19 at 1520
 
 
 
 
 
 
 
CC:                                                             [id number]
DICTATION DATE: 09/02/19 1228     :     09/02/19 1238      ADM IN  
                                                                              
[facility]                                          
[address] [place] [address]

## 2019-09-03 NOTE — NUR
RECEIVED IN DAYROOM. SITTING IN A CHAIR WITH PEERS AT HER SIDE. CALM AND
COOPERATIVE WITH CARE AND ASSESSMENT. ENCOURAGE TO EXPRESS NEEDS. SITTING
QUIETLY AT THIS TIME. CONTINUE PLAN OF CARE

## 2019-09-03 NOTE — NUR
updated pt's daughter josh in regards to pt condition and discharge
planning. verbalized understanding.

## 2019-09-04 VITALS — SYSTOLIC BLOOD PRESSURE: 119 MMHG | DIASTOLIC BLOOD PRESSURE: 59 MMHG

## 2019-09-04 VITALS — SYSTOLIC BLOOD PRESSURE: 112 MMHG | DIASTOLIC BLOOD PRESSURE: 47 MMHG

## 2019-09-04 NOTE — NUR
PT AM MEDS ADMINISTERED. PT REFUSED MIRALAX AND VOLTAREN GEL. PT SITTING UP
EATING BREAKFAST AT THIS TIME. WCTM.

## 2019-09-04 NOTE — PN
PATIENT:KELLY BAEZA                                MEDICAL RECORD: [id number]
                                                         LOCATION:[facility]
                                                         ADMISSION DATE: 08/22/19
 
PROGRESS NOTE
 
 
DATE OF SERVICE:  09/03/2019
 
SUBJECTIVE:  The patient's case was discussed with staff.  She has no new
complaint.
 
OBJECTIVE:  The patient is sleeping very well.  She slept almost 10 hours last
night.  Her mood has improved as well.  She is smiling and interacting more.
 
ASSESSMENT:  Major depression.
 
PLAN:  The patient was wanting to go to rehab and we discussed this at the
treatment team.  If the family would like a referral for that, we will of course
make it, but given how ambulatory and independent she is I am certain they are
not going to accept her.  The patient is wanting to go home.  Her daughters and
 are considering other options for her.  I think given her presentation
when she came in, I can justify her stay a little bit longer, but she certainly
does not meet criteria for an involuntary stay if she wanted to leave or
demanded to leave.  She has improved dramatically.
 
TRANSINT:[id number] Voice Confirmation ID: [id number] DOCUMENT ID: [id number]
 
 
 
 
                                           
                                           MERY KNIGHT MD             
 
 
 
Electronically Signed by MERY KNIGHT on 09/04/19 at 1525
 
 
 
 
 
 
 
 
 
 
 
 
 
 
CC:                                                             [id number]
DICTATION DATE: 09/03/19 1544     :     09/03/19 1644      ADM IN  
                                                                              
[id number] [place] [address]

## 2019-09-05 VITALS — DIASTOLIC BLOOD PRESSURE: 51 MMHG | SYSTOLIC BLOOD PRESSURE: 114 MMHG

## 2019-09-05 VITALS — DIASTOLIC BLOOD PRESSURE: 41 MMHG | SYSTOLIC BLOOD PRESSURE: 131 MMHG

## 2019-09-05 NOTE — PN
PATIENT:KELLY BAEZA                                MEDICAL RECORD: [id number]
                                                         LOCATION:[facility]
                                                         ADMISSION DATE: 08/22/19
 
PROGRESS NOTE
 
 
DATE OF SERVICE:  09/04/2019
 
SUBJECTIVE:  The patient's case was discussed with staff.  She has no new
complaint.
 
OBJECTIVE:  The patient is in good behavioral control with poor insight about
her condition.  She is tolerating her medicines well.
 
ASSESSMENT:  No change in diagnoses.
 
PLAN:  Current medicines have been reviewed.  Apparently, the family is now
looking at assisted living.  The patient is wanting very much to go home, but I
have asked her if she would stay until those arrangements are made and hoping it
will not be more than a day or two and she has agreed to do that.
 
TRANSINT:[id number] Voice Confirmation ID: [id number] DOCUMENT ID: [id number]
 
 
 
 
                                           
                                           MERY KNIGHT MD             
 
 
 
Electronically Signed by MERY KNIGHT on 09/05/19 at 1507
 
 
 
 
 
 
 
 
 
 
 
 
 
 
 
 
 
 
CC:                                                             [id number]
DICTATION DATE: 09/04/19 1643     :     09/04/19 2319      ADM IN  
                                                                              
[id number] [place] [address]

## 2019-09-05 NOTE — NUR
B) Patient is alert and oriented to person and place, quiet and keeps to
herself
I) Administered scheduled medications as ordered, monitored for safety
R) Mediation compliant, sleeping quietly in his bed,
P) Continue plan of care.

## 2019-09-05 NOTE — NUR
TEAM TREATMENT CARE:
DIET: Regular Diet
PO INTAKE: 90% x 8 meals
WT: 8/.8 lbs; 9/1-151.4 lbs
BM: x 1 on 9/3
MEDS: Miralax, Vit B12
LABS: LDL-120(H), Vit T96-9922(H)
GOALS: PO intake >/= 75%, BM q 3 days, stable wt DHS, Maintain skin integrity
 
Will continue to monitor closely
Clinical Deititian Following

## 2019-09-06 VITALS — DIASTOLIC BLOOD PRESSURE: 38 MMHG | SYSTOLIC BLOOD PRESSURE: 106 MMHG

## 2019-09-06 VITALS — SYSTOLIC BLOOD PRESSURE: 131 MMHG | DIASTOLIC BLOOD PRESSURE: 41 MMHG

## 2019-09-06 VITALS — SYSTOLIC BLOOD PRESSURE: 117 MMHG | DIASTOLIC BLOOD PRESSURE: 54 MMHG

## 2019-09-06 NOTE — PN
PATIENT:KELLY BAEZA                                MEDICAL RECORD: [id number]
                                                         LOCATION:[facility]
                                                         ADMISSION DATE: 08/22/19
 
PROGRESS NOTE
 
 
DATE OF SERVICE:  09/05/2019
 
SUBJECTIVE:  The patient's case was discussed with staff.  She has no new
complaint.
 
OBJECTIVE:  The patient is in good behavioral control and has no thoughts of
harming herself or others.  Her mood is almost completely euthymic.
 
ASSESSMENT:  Major depression.
 
PLAN:  The patient's family has made arrangements for her to go to an assisted
living center.  They should have her room ready on Saturday.  I anticipate she
can be transitioned out of the hospital on Saturdays if this level of
improvement continues.
 
TRANSINT:[id number] Voice Confirmation ID: [id number] DOCUMENT ID: [id number]
 
 
 
 
                                           
                                           MERY KNIGHT MD             
 
 
 
Electronically Signed by MERY KNIGHT on 09/06/19 at 1602
 
 
 
 
 
 
 
 
 
 
 
 
 
 
 
 
 
 
CC:                                                             [id number]
DICTATION DATE: 09/05/19 1601     :     09/05/19 2335      ADM IN  
                                                                              
[id number] [place] [address]

## 2019-09-06 NOTE — NUR
PATIENT TENDS TO STAY TO HERSELF, HOWEVER HER AFFECT IS BRIGHTER THAN USUAL.
COMPLIANT WITH MEDS. WILL FOLLOW POC

## 2019-09-06 NOTE — NUR
B) The patient is awake and alert, she is pleasant and she says she feels less
depressed today. She ambulates independently. I) Provide prescribed meds. R)
The patient is compliant with meds and unit milieu. P) Continue POC.

## 2019-09-06 NOTE — NUR
B.) PT IS ALERT AND ORIENTED X4. SHE IS PLEASANT AND STATES AN IMPROVEMENT IN
HER MOOD TODAY. SHE AMBULATES AND CAN MAKE NEEDS KNOWN. SHE IS CALM AND
COOPERATIVE.
I.) PROVIDED PM MEDICATIONS.
R.) COMPLIANT WITH ALL MEDICATIONS.
P.) CONTINUE PLAN OF CARE

## 2019-09-07 VITALS — SYSTOLIC BLOOD PRESSURE: 120 MMHG | DIASTOLIC BLOOD PRESSURE: 51 MMHG

## 2019-09-07 NOTE — NUR
PATIENT FAMILY CAME TO PICK PATIENT UP BELONGINGS GIVEN TO FAMILY EXCEPT FOR A
BLUE PAIR OF SLIPPERS. STAFF WAS UNABLE TO FIND SLIPPERS. PATIENT FAMILY ASKED
ABOUT A REFERRAL FOR PATIENT TO SEE SOMEONE AFTER DISCHARGE. NURSE EXPLAINED
NO APPOINTMENT WAS IN THE NOTES FOR DISCHARGE. FAMILY STATED THEY WOULD CALL
DR. KNIGHT. NURSE OFFERED TO SPEAK WITH VANESSA AND FIND SOME INFORMAITON. SHE
SAID SHE CALL DR. KNIGHT. WENT OVER PATIENT MEDICATIONS WITH DAUGHTER AND
TOLD HER THE FACILITY SHE WAS DISCHARGING TO WOULD PROVIDE HER MEDICATIONS.
PAPERWORK FAXED TO FACILITY. REPORT GIVEN TO MAJO SALDAÑA AT 3227 AND PAPERWORK
FAXED TO FACILITY.

## 2019-09-07 NOTE — NUR
Patient did go to [facility] and they did call to request that we call
Celexa to [facility] and the daughter will pick it up.

## 2019-09-07 NOTE — NUR
B) The patient is flat in affect she is blunted in answers, she has poor
insight into her situation. She knows she is leaving today, but she is a bit
nervous about it. She remains depressed, but denies S.I. I) Provide prescribed
meds. R) The patient is compliant with meds. P) Continue POC. Embolism, Pulmonary (English) View Edit Remove

## 2019-09-07 NOTE — PN
PATIENT:KELLY BAEZA                                MEDICAL RECORD: [id number]
                                                         LOCATION:[facility]
                                                         ADMISSION DATE: 08/22/19
 
PROGRESS NOTE
 
 
DATE OF SERVICE:  09/06/2019
 
SUBJECTIVE:  The patient's case was discussed with staff.  She has no new
complaint.
 
OBJECTIVE:  The patient is in good behavioral control with limited insight about
her condition.  She has a depressed mood, but no thoughts of harming herself or
others.
 
ASSESSMENT:  Major depression.
 
PLAN:  The patient will be transitioned out of the hospital into the [facility] tomorrow.  Long-term prognosis is guarded.
 
TRANSINT:[id number] Voice Confirmation ID: [id number] DOCUMENT ID: [id number]
 
 
 
 
                                           
                                           MERY KNIGHT MD             
 
 
 
Electronically Signed by MERY KNIGHT on 09/07/19 at 1244
 
 
 
 
 
 
 
 
 
 
 
 
 
 
 
 
 
 
 
CC:                                                             [id number]
DICTATION DATE: 09/06/19 1614     :     09/06/19 1926      ADM IN  
                                                                              
[facility]                                          
[address] [place] [address]

## 2019-09-08 NOTE — PN
PATIENT:KELLY BAEZA                                MEDICAL RECORD: [id number]
                                                         LOCATION:[facility]
                                                         ADMISSION DATE: 08/22/19
 
PROGRESS NOTE
 
 
DATE OF SERVICE:  09/07/2019
 
SUBJECTIVE:  The patient's case was discussed with staff.  She has no new
complaint.
 
OBJECTIVE:  The patient denies intent to harm herself or others.  Her mood is
near euthymic.  She is eating and sleeping well.
 
ASSESSMENT:  No change in diagnoses.
 
PLAN:  The patient will be transitioned out of the hospital today.  She is going
to go to assisted living and follow up will be with her primary care physician
and Dr. Villatoro.
 
TRANSINT:[id number] Voice Confirmation ID: [id number] DOCUMENT ID: [id number]
 
 
 
 
                                           
                                           MERY KNIGHT MD             
 
 
 
Electronically Signed by MERY KNIGHT on 09/08/19 at 1129
 
 
 
 
 
 
 
 
 
 
 
 
 
 
 
 
 
 
 
CC:                                                             [id number]
DICTATION DATE: 09/07/19 1317     :     09/07/19 1404      DIS IN  
                                                                      09/07/19
[id number] [place] [address]

## 2019-09-10 NOTE — DS
PATIENT:KELLY BAEZA                        :37   MEDICAL RECORD: [id number]
 
                              DISCHARGE SUMMARY
                                                         
ADMISSION DATE:    19                       DISCHARGE DATE:     19
 
 
IDENTIFYING DATA:  The patient is 84 years old and she is admitted to the
hospital on a voluntary basis.
 
CHIEF COMPLAINT:  "I cannot sleep."
 
HISTORY OF PRESENT ILLNESS:  The patient is a very nice elderly woman under a
great deal of stress.  Her  is disabled and requires a great deal of
care.  She has been overwhelmed and trying to manage things and her adult
daughter from [place] has come to help even though that is a major
imposition on her and her family.  The patient tells me she has thought about
killing herself, but says she will not do so because of her Yazidi mychal. 
She endorses numerous neurovegetative depressive symptoms and feels helpless,
hopeless, isolated, and withdrawn.  She is very focused on not sleeping.
 
HOSPITAL COURSE:  The patient was admitted to the hospital and fully evaluated
from both a medical, psychological, and social standpoint.  She was found to be
severely depressed and was treated with antidepressant medications.  She has
been taking medication that had not been effective.  Her medicine was changed. 
Efforts to assist her with sleep consolidation had resulted in some temporary
over sedation, but eventually an adequate dose was arranged that allowed her to
sleep reasonably at night and not be overly depressed.  The patient is
emotionally not able to care for her .  There is a great deal of guilt
she has about this as well as a great deal of guilt, she feels about imposing
upon her daughter.  The patient was interested in going to assisted living and
arrangements were made for that to happen.  She felt very relieved not to have
to worry about bills, housekeeping, cooking, preparing his medicines and a
variety of other things that she just simply feels overwhelmed and trying to
handle.
 
DISCHARGE DIAGNOSES:
AXIS I:  Major depression, single episode, severe without psychotic features.
AXIS II:  None.
AXIS III:  Hypertension, hyperlipidemia, gastroesophageal reflux disease.
AXIS IV:  Moderate stressors.
AXIS V:  Global assessment of functioning is 40.
 
PLAN:  At the time of discharge, the patient had significantly improved and had
a near euthymic mood.  She was tolerating her antidepressant medicines well and
sleeping well.
 
Followup is to be with Dr. Dari Villatoro.  Her long-term prognosis is guarded.
 
TRANSINT:[id number] Voice Confirmation ID: [id number] DOCUMENT ID: [id number]
 
 
 
DISCHARGE SUMMARY REPORT                       [id number]    KELLY BAEZA PETER MD             
 
 
 
Electronically Signed by MERY KNIGHT on 09/10/19 at 0925
 
 
 
 
 
 
 
 
 
 
 
 
 
 
 
 
 
 
 
 
 
 
 
 
 
 
 
 
 
 
 
 
 
 
 
 
 
 
 
 
 
CC:                                                             [id number]
DICTATION DATE: 19 1556     :     09/10/19 0444      DIS IN  
                                                                      19
[facility]                                          
[address] [id number]
